# Patient Record
Sex: FEMALE | Race: WHITE | NOT HISPANIC OR LATINO | Employment: OTHER | URBAN - METROPOLITAN AREA
[De-identification: names, ages, dates, MRNs, and addresses within clinical notes are randomized per-mention and may not be internally consistent; named-entity substitution may affect disease eponyms.]

---

## 2021-04-15 DIAGNOSIS — Z23 ENCOUNTER FOR IMMUNIZATION: ICD-10-CM

## 2021-05-10 ENCOUNTER — OFFICE VISIT (OUTPATIENT)
Dept: OBGYN CLINIC | Facility: CLINIC | Age: 76
End: 2021-05-10
Payer: MEDICARE

## 2021-05-10 VITALS
SYSTOLIC BLOOD PRESSURE: 110 MMHG | WEIGHT: 161 LBS | DIASTOLIC BLOOD PRESSURE: 70 MMHG | HEIGHT: 62 IN | BODY MASS INDEX: 29.63 KG/M2

## 2021-05-10 DIAGNOSIS — N81.2 CYSTOCELE AND RECTOCELE WITH INCOMPLETE UTEROVAGINAL PROLAPSE: ICD-10-CM

## 2021-05-10 DIAGNOSIS — Z46.89 PESSARY MAINTENANCE: ICD-10-CM

## 2021-05-10 DIAGNOSIS — N95.2 POSTMENOPAUSAL ATROPHIC VAGINITIS: ICD-10-CM

## 2021-05-10 DIAGNOSIS — E66.3 OVERWEIGHT (BMI 25.0-29.9): ICD-10-CM

## 2021-05-10 DIAGNOSIS — N81.89 PELVIC FLOOR RELAXATION: Primary | ICD-10-CM

## 2021-05-10 DIAGNOSIS — Z78.0 POST-MENOPAUSAL: ICD-10-CM

## 2021-05-10 PROCEDURE — 99213 OFFICE O/P EST LOW 20 MIN: CPT | Performed by: OBSTETRICS & GYNECOLOGY

## 2021-05-10 RX ORDER — ROSUVASTATIN CALCIUM 5 MG/1
5 TABLET, COATED ORAL DAILY
COMMUNITY
Start: 2021-02-19 | End: 2021-05-10 | Stop reason: ALTCHOICE

## 2021-05-10 NOTE — PROGRESS NOTES
[address], [address], [facility], [address] [place] [address]      ASSESSMENT/PLAN: Scotty Barry is a 70 y o   who presents for evaluation for bleeding after replacing her  pessary after a year  - The following were reviewed in today's visit: {Gyn    Additional problems addressed during this visit:  1  Pelvic floor relaxation  -     Pessary    2  Post-menopausal  -     Pessary    3  Pessary maintenance  -     Pessary    4  Cystocele and rectocele with incomplete uterovaginal prolapse  -     Pessary    5  Postmenopausal atrophic vaginitis    6  Overweight (BMI 25 0-29 9)     pt  Here for  Fu  Had not  Been wearing her pessary for most of  Last  Year due to [place]  Placed it in the  vagina  Last  Week,  ( Self Care)  And had some very light spotting  after  Removed   Not using  Estradiol  Cream    No erosion noted on exam    Encouraged use of  Estradiol Cream one inch , nightly for a month then   every other day  Fu in 3 mo wellness  No problems  Emptying  Bowels, w  Pessary out , sometimes has to wait a while to empty  Fu prn  Reviewed  S+s of   Urinary retention  CC: Bleeding after placing   Pessary back in very light pink  Not  Using estradiol  Cream     HPI: see  CC     The following portions of the patient's history were reviewed and updated as appropriate: She  has a past medical history of H/O bone density study (2017), H/O mammogram (2021), Menopausal symptoms, Papanicolaou smear (2017), and Stress incontinence  She  has a past surgical history that includes Hemorroidectomy; Bunionectomy; Cataract extraction (Left); Rotator cuff repair (2016); and DXA procedure(historical) (2017)    Her family history includes Heart attack in her mother; Heart disease in her mother; Hyperlipidemia in her maternal aunt and mother; Osteoarthritis in her maternal grandmother; Stroke in her father and mother; Tuberculosis in her paternal aunt  She  reports that she has never smoked  She has never used smokeless tobacco  She reports current alcohol use  She reports that she does not use drugs  Current Outpatient Medications   Medication Sig Dispense Refill    Nutritional Supplements (CHOLESTEROL DEFENSE PO) Take by mouth       No current facility-administered medications for this visit  She has No Known Allergies       Review of Systems:  All systems normal except as noted in HPIessary since  2019  Pt  Was traveling and  Self cares  + took out  In   Early  2020 and  Just replaced  Last week w  Some  Bleeding  No lekaing of urine  + ability toempty bowels  Semi sexually active  No dysuria or  Frequency  Some  Pressure                Objective:  /70   Ht 5' 2" (1 575 m)   Wt 73 kg (161 lb)   BMI 29 45 kg/m²    Physical Exam  Vitals signs and nursing note reviewed  Constitutional:       Appearance: Normal appearance  HENT:      Head: Normocephalic  Neck:      Musculoskeletal: Normal range of motion  Abdominal:      General: Abdomen is flat  There is no distension  Palpations: Abdomen is soft  There is no mass  Tenderness: There is no abdominal tenderness  There is no guarding or rebound  Hernia: A hernia is present  There is no hernia in the left inguinal area or right inguinal area  Genitourinary:     General: Normal vulva  Exam position: Lithotomy position  Pubic Area: No rash  Labia:         Right: No rash, tenderness or lesion  Left: No rash, tenderness or lesion  Urethra: No prolapse, urethral pain, urethral swelling or urethral lesion  Vagina: No signs of injury and foreign body  Prolapsed vaginal walls present  No vaginal discharge, erythema, bleeding or lesions  Cervix: No cervical motion tenderness, discharge, friability, lesion or cervical bleeding        Uterus: Normal        Adnexa: Right adnexa normal and left adnexa normal       Rectum: Normal  Comments: Vagina  Pale  Pink   2 degree  Cystocele w  Valsalva   Levators   3 on scale  1-4  , no erosion or active bleeding noted  Vagina  Cleansed with soap and water  And pessary replaced  Musculoskeletal: Normal range of motion  Lymphadenopathy:      Lower Body: No right inguinal adenopathy  No left inguinal adenopathy  Skin:     General: Skin is warm and dry  Neurological:      Mental Status: She is alert     Psychiatric:         Mood and Affect: Mood normal

## 2021-05-10 NOTE — PATIENT INSTRUCTIONS
Report increase in leaking of urine    Inability to  Empty bowels or  Bladder  To use  Estradiol  Cream   1  Inch    Every night for   One month then  Every other night  Apply small amt  To  Anterior   Opening    Schedule a wellness exam in  3 mo

## 2021-08-26 ENCOUNTER — OFFICE VISIT (OUTPATIENT)
Dept: OBGYN CLINIC | Facility: CLINIC | Age: 76
End: 2021-08-26
Payer: MEDICARE

## 2021-08-26 VITALS
DIASTOLIC BLOOD PRESSURE: 60 MMHG | WEIGHT: 154.8 LBS | HEIGHT: 62 IN | SYSTOLIC BLOOD PRESSURE: 102 MMHG | BODY MASS INDEX: 28.49 KG/M2

## 2021-08-26 DIAGNOSIS — N81.2 CYSTOCELE AND RECTOCELE WITH INCOMPLETE UTEROVAGINAL PROLAPSE: ICD-10-CM

## 2021-08-26 DIAGNOSIS — Z78.0 POST-MENOPAUSAL: ICD-10-CM

## 2021-08-26 DIAGNOSIS — E66.3 OVERWEIGHT (BMI 25.0-29.9): ICD-10-CM

## 2021-08-26 DIAGNOSIS — N95.2 POSTMENOPAUSAL ATROPHIC VAGINITIS: ICD-10-CM

## 2021-08-26 DIAGNOSIS — N81.89 PELVIC FLOOR RELAXATION: Primary | ICD-10-CM

## 2021-08-26 DIAGNOSIS — Z46.89 PESSARY MAINTENANCE: ICD-10-CM

## 2021-08-26 PROCEDURE — 99213 OFFICE O/P EST LOW 20 MIN: CPT | Performed by: OBSTETRICS & GYNECOLOGY

## 2021-08-26 RX ORDER — ROSUVASTATIN CALCIUM 5 MG/1
5 TABLET, COATED ORAL DAILY
COMMUNITY
Start: 2021-06-18

## 2021-08-26 NOTE — PROGRESS NOTES
PROBLEM GYNECOLOGICAL VISIT    Franc Carroll is a 78 y o  female who presents today for  Removal and  repalcement of pessary  Her general medical history has been reviewed and she reports it as follows:    Past Medical History:   Diagnosis Date    H/O bone density study 2017    Osteopenic     H/O mammogram 2021    BIRADS1    Menopausal symptoms     Papanicolaou smear 2017    Stress incontinence      Past Surgical History:   Procedure Laterality Date    BUNIONECTOMY      CATARACT EXTRACTION Left     [facility]     HEMORROIDECTOMY      ROTATOR CUFF REPAIR  2016     OB History        2    Para   2    Term                AB        Living           SAB        TAB        Ectopic        Multiple        Live Births               Obstetric Comments   Menopause: Age 50  Menarche: Age 8  Vaginal atrophy, pelvic floor relaxation            Social History     Tobacco Use    Smoking status: Never Smoker    Smokeless tobacco: Never Used   Vaping Use    Vaping Use: Never used   Substance Use Topics    Alcohol use: Yes     Comment: Special occasions (1 or less/month)     Drug use: Never     Comment: No        Current Outpatient Medications   Medication Instructions    Nutritional Supplements (CHOLESTEROL DEFENSE PO) Take by mouth    rosuvastatin (CRESTOR) 5 mg, Oral, Daily       History of Present Illness:   Pt  Here for  Pessary removal and  Cleaning  No leaking of  Urine  + , +  Multiple   BM  Attempts  Not  Completely  Emptying  Self cares for  Pessary  Can leave it out for a couple of weeks  Review of Systems:  Review of Systems   Constitutional: Positive for appetite change  Negative for chills and fever  HENT: Negative for ear pain and sore throat  Eyes: Negative for pain and visual disturbance  Respiratory: Negative for cough and shortness of breath      Cardiovascular: Negative for chest pain and palpitations  Gastrointestinal: Negative  Negative for abdominal pain and vomiting  Endocrine: Negative  Genitourinary: Negative  Negative for dysuria, hematuria, menstrual problem, pelvic pain, urgency, vaginal bleeding, vaginal discharge and vaginal pain  Musculoskeletal: Negative for arthralgias and back pain  Skin: Negative for color change and rash  Neurological: Negative  Negative for seizures and syncope  Psychiatric/Behavioral: Negative  All other systems reviewed and are negative  Physical Exam:  /60   Ht 5' 1 5" (1 562 m)   Wt 70 2 kg (154 lb 12 8 oz)   Breastfeeding No   BMI 28 78 kg/m²   Physical Exam  Constitutional:       Appearance: Normal appearance  She is normal weight  Genitourinary:      Pelvic exam was performed with patient in the lithotomy position  Vulva, inguinal canal, urethra and bladder normal       Vaginal atrophic mucosa and prolapse present  Genitourinary Comments: Pessary removed intact    Vagina cleansed with soap  and  Water no erosion  Pessary  Ring w support   Replaced  Noted   2 degree rectocele      Rectum:      External hemorrhoid present  HENT:      Head: Normocephalic  Abdominal:      General: Abdomen is flat  Bowel sounds are normal       Palpations: Abdomen is soft  Neurological:      Mental Status: She is alert  Assessment:   1  Pelvic floor relaxation  , Pessary maintence    Plan:   Report  Bleeding , inability to empty bowels or bladder  Pt considering OC w physician for  Surgical options in  Next  8 mo    Reviewed with patient that test results are available [facility] immediately, but that they will not necessarily be reviewed by me immediately  Explained that I will review results at my earliest opportunity and contact patient appropriately

## 2021-08-31 ENCOUNTER — VBI (OUTPATIENT)
Dept: ADMINISTRATIVE | Facility: OTHER | Age: 76
End: 2021-08-31

## 2021-08-31 NOTE — TELEPHONE ENCOUNTER
Upon review of the In Basket request we were able to locate, review, and update the patient chart as requested for Mammogram and Pap Smear (HPV) aka Cervical Cancer Screening  Any additional questions or concerns should be emailed to the Practice Liaisons via [email or web address] email, please do not reply via In Basket      Thank you  Hailey Ayala

## 2021-11-18 ENCOUNTER — ANNUAL EXAM (OUTPATIENT)
Dept: OBGYN CLINIC | Facility: CLINIC | Age: 76
End: 2021-11-18
Payer: MEDICARE

## 2021-11-18 VITALS
WEIGHT: 157 LBS | SYSTOLIC BLOOD PRESSURE: 120 MMHG | BODY MASS INDEX: 27.82 KG/M2 | DIASTOLIC BLOOD PRESSURE: 62 MMHG | HEIGHT: 63 IN

## 2021-11-18 DIAGNOSIS — Z12.31 ENCOUNTER FOR SCREENING MAMMOGRAM FOR MALIGNANT NEOPLASM OF BREAST: ICD-10-CM

## 2021-11-18 DIAGNOSIS — Z13.820 OSTEOPOROSIS SCREENING: ICD-10-CM

## 2021-11-18 DIAGNOSIS — Z78.0 POST-MENOPAUSAL: ICD-10-CM

## 2021-11-18 DIAGNOSIS — N81.89 PELVIC FLOOR RELAXATION: ICD-10-CM

## 2021-11-18 DIAGNOSIS — N81.2 CYSTOCELE AND RECTOCELE WITH INCOMPLETE UTEROVAGINAL PROLAPSE: ICD-10-CM

## 2021-11-18 DIAGNOSIS — E28.39 ESTROGEN DEFICIENCY: ICD-10-CM

## 2021-11-18 DIAGNOSIS — Z01.419 ENCOUNTER FOR GYNECOLOGICAL EXAMINATION WITHOUT ABNORMAL FINDING: Primary | ICD-10-CM

## 2021-11-18 PROCEDURE — G0101 CA SCREEN;PELVIC/BREAST EXAM: HCPCS | Performed by: OBSTETRICS & GYNECOLOGY

## 2022-02-17 ENCOUNTER — OFFICE VISIT (OUTPATIENT)
Dept: OBGYN CLINIC | Facility: CLINIC | Age: 77
End: 2022-02-17
Payer: MEDICARE

## 2022-02-17 VITALS
WEIGHT: 155.6 LBS | BODY MASS INDEX: 27.57 KG/M2 | HEIGHT: 63 IN | DIASTOLIC BLOOD PRESSURE: 68 MMHG | SYSTOLIC BLOOD PRESSURE: 102 MMHG

## 2022-02-17 DIAGNOSIS — N81.89 PELVIC FLOOR RELAXATION: ICD-10-CM

## 2022-02-17 DIAGNOSIS — E66.3 OVERWEIGHT (BMI 25.0-29.9): ICD-10-CM

## 2022-02-17 DIAGNOSIS — Z78.0 POST-MENOPAUSAL: ICD-10-CM

## 2022-02-17 DIAGNOSIS — N81.2 CYSTOCELE AND RECTOCELE WITH INCOMPLETE UTEROVAGINAL PROLAPSE: ICD-10-CM

## 2022-02-17 DIAGNOSIS — Z46.89 PESSARY MAINTENANCE: ICD-10-CM

## 2022-02-17 DIAGNOSIS — N95.2 POSTMENOPAUSAL ATROPHIC VAGINITIS: Primary | ICD-10-CM

## 2022-02-17 PROCEDURE — 99213 OFFICE O/P EST LOW 20 MIN: CPT | Performed by: OBSTETRICS & GYNECOLOGY

## 2022-02-17 NOTE — PROGRESS NOTES
PROBLEM GYNECOLOGICAL VISIT    Aubree Carpio is a 78 y o  female who presents today for pessary cleaning   No leaking of urine, bleeding   Her general medical history has been reviewed and she reports it as follows:    Past Medical History:   Diagnosis Date    H/O bone density study 2017    Osteopen[facility]     H/O mammogram 2021    BIRADS1    Menopausal symptoms     Papanicolaou smear 2017    Pelvic floor relaxation     Stress incontinence      Past Surgical History:   Procedure Laterality Date    BUNIONECTOMY      CATARACT EXTRACTION Left     [facility]     HEMORROIDECTOMY      ROTATOR CUFF REPAIR  2016    SKIN BIOPSY Right 2022    basal cell removal from right side of chest      OB History        2    Para   2    Term                AB        Living           SAB        IAB        Ectopic        Multiple        Live Births               Obstetric Comments   Menopause: Age 50  Menarche: Age 8  Vaginal atrophy, pelvic floor relaxation            Social History     Tobacco Use    Smoking status: Never Smoker    Smokeless tobacco: Never Used   Vaping Use    Vaping Use: Never used   Substance Use Topics    Alcohol use: Yes     Comment: Special occasions (1 or less/month)     Drug use: Never     Comment: No        Current Outpatient Medications   Medication Instructions    rosuvastatin (CRESTOR) 5 mg, Oral, Daily       History of Present Illness:              Pt  Here for  Pessary removal and  Cleaning  No leaking of  Urine  + , +  Multiple   BM  Attempts  Not  Completely  Emptying  Self cares for  Pessary  Can leave it out for a couple of weeks  Review of Systems:  Review of Systems   Constitutional: Positive for appetite change  Negative for chills and fever  HENT: Negative for ear pain and sore throat  Eyes: Negative for pain and visual disturbance     Respiratory: Negative for cough and shortness of breath  Cardiovascular: Negative for chest pain and palpitations  Gastrointestinal: Negative  Negative for abdominal pain and vomiting  Endocrine: Negative  Genitourinary: Negative  Negative for dysuria, hematuria, menstrual problem, pelvic pain, urgency, vaginal bleeding, vaginal discharge and vaginal pain  Musculoskeletal: Negative for arthralgias and back pain  Skin: Negative for color change and rash  Neurological: Negative  Negative for seizures and syncope  Psychiatric/Behavioral: Negative  All other systems reviewed and are negative  Physical Exam:  /68   Ht 5' 2 5" (1 588 m)   Wt 70 6 kg (155 lb 9 6 oz)   Breastfeeding No   BMI 28 01 kg/m²   Genitourinary:      Pelvic exam was performed with patient in the lithotomy position  Vulva, inguinal canal, urethra and bladder normal       Vaginal atrophic mucosa and prolapse present  Genitourinary Comments: Pessary removed intact    Vagina cleansed with soap  and  Water no erosion  Pessary  Ring w support   Replaced  Noted   2 degree rectocele      Rectum:      External hemorrhoid present  HENT:      Head: Normocephalic  Abdominal:      General: Abdomen is flat  Bowel sounds are normal       Palpations: Abdomen is soft  Neurological:      Mental Status: She is alert  Assessment:              1  Pelvic floor relaxation  , Pessary maintence     Plan:              Report  Bleeding , inability to empty bowels or bladder  Pt considering OC w physician for  Surgical options in  Next  8 mo     Reviewed with patient that test results are available in MyChart immediately, but that they will not necessarily be reviewed by me immediately  Explained that I will review results at my earliest opportunity and contact patient appropriately

## 2022-08-18 ENCOUNTER — OFFICE VISIT (OUTPATIENT)
Dept: OBGYN CLINIC | Facility: CLINIC | Age: 77
End: 2022-08-18

## 2022-08-18 VITALS
BODY MASS INDEX: 28.6 KG/M2 | DIASTOLIC BLOOD PRESSURE: 60 MMHG | HEIGHT: 62 IN | SYSTOLIC BLOOD PRESSURE: 118 MMHG | WEIGHT: 155.4 LBS

## 2022-08-18 DIAGNOSIS — N81.2 CYSTOCELE AND RECTOCELE WITH INCOMPLETE UTEROVAGINAL PROLAPSE: ICD-10-CM

## 2022-08-18 DIAGNOSIS — N95.2 POSTMENOPAUSAL ATROPHIC VAGINITIS: Primary | ICD-10-CM

## 2022-08-18 DIAGNOSIS — Z46.89 PESSARY MAINTENANCE: ICD-10-CM

## 2022-08-18 NOTE — PATIENT INSTRUCTIONS
Report  Vaginal bleeding, odor and inability to empty bowels and  Bladder    Call  when  you are ready for a consult with  physician

## 2022-08-18 NOTE — PROGRESS NOTES
PROBLEM GYNECOLOGICAL VISIT    Daniel Rubio is a 78 y o  female who presents today for pessary cleaning   No leaking of urine, bleeding   Her general medical history has been reviewed and she reports it as follows:    Past Medical History:   Diagnosis Date    H/O bone density study 2017    [facility]steopen[facility]     H/O mammogram 2021    BIRADS1    Menopausal symptoms     Papanicolaou smear 2017    Pelvic floor relaxation     Stress incontinence      Past Surgical History:   Procedure Laterality Date    BUNIONECTOMY      CATARACT EXTRACTION Left     [facility]     HEMORROIDECTOMY      ROTATOR CUFF REPAIR  2016    SKIN BIOPSY Right 2022    basal cell removal from right side of chest      OB History        2    Para   2    Term                AB        Living           SAB        IAB        Ectopic        Multiple        Live Births               Obstetric Comments   Menopause: Age 50  Menarche: Age 8  Vaginal atrophy, pelvic floor relaxation            Social History     Tobacco Use    Smoking status: Never Smoker    Smokeless tobacco: Never Used   Vaping Use    Vaping Use: Never used   Substance Use Topics    Alcohol use: Yes     Comment: Special occasions (1 or less/month)     Drug use: Never     Comment: No        Current Outpatient Medications   Medication Instructions    rosuvastatin (CRESTOR) 5 mg, Oral, Daily       History of Present Illness:              Pt  Here for  Pessary removal and  Cleaning  No leaking of  Urine  + , +  Multiple   BM  Attempts  Not  Completely  Emptying  Self cares for  Pessary  Can leave it out for a couple of weeks  Review of Systems:  Review of Systems   Constitutional: Positive for appetite change  Negative for chills and fever  HENT: Negative for ear pain and sore throat  Eyes: Negative for pain and visual disturbance     Respiratory: Negative for cough and shortness of breath  Cardiovascular: Negative for chest pain and palpitations  Gastrointestinal: Negative  Negative for abdominal pain and vomiting  Endocrine: Negative  Genitourinary: Negative  Negative for dysuria, hematuria, menstrual problem, pelvic pain, urgency, vaginal bleeding, vaginal discharge and vaginal pain  Musculoskeletal: Negative for arthralgias and back pain  Skin: Negative for color change and rash  Neurological: Negative  Negative for seizures and syncope  Psychiatric/Behavioral: Negative  All other systems reviewed and are negative  Physical Exam:  There were no vitals taken for this visit  Genitourinary:      Pelvic exam was performed with patient in the lithotomy position  Vulva, inguinal canal, urethra and bladder normal       Vaginal atrophic mucosa and prolapse present  Genitourinary Comments: Pessary removed intact    Vagina cleansed with soap  and  Water no erosion  Pessary  Ring w support   Replaced  Noted   2 degree rectocele      Rectum:      External hemorrhoid present  HENT:      Head: Normocephalic  Abdominal:      General: Abdomen is flat  Bowel sounds are normal       Palpations: Abdomen is soft  Neurological:      Mental Status: She is alert  Assessment:              1  Pelvic floor relaxation  , Pessary maintence     Plan:              Report  Bleeding , inability to empty bowels or bladder  Pt considering OC w physician for  Surgical options in  Next  8 mo     Reviewed with patient that [facility], but that they will not necessarily be reviewed by me immediately  Explained that I will review results at my earliest opportunity and contact patient appropriately

## 2023-02-23 ENCOUNTER — OFFICE VISIT (OUTPATIENT)
Dept: OBGYN CLINIC | Facility: CLINIC | Age: 78
End: 2023-02-23

## 2023-02-23 VITALS
BODY MASS INDEX: 27.75 KG/M2 | DIASTOLIC BLOOD PRESSURE: 62 MMHG | SYSTOLIC BLOOD PRESSURE: 110 MMHG | WEIGHT: 156.6 LBS | HEIGHT: 63 IN

## 2023-02-23 DIAGNOSIS — E66.3 OVERWEIGHT (BMI 25.0-29.9): ICD-10-CM

## 2023-02-23 DIAGNOSIS — Z46.89 PESSARY MAINTENANCE: ICD-10-CM

## 2023-02-23 DIAGNOSIS — Z78.0 POST-MENOPAUSAL: ICD-10-CM

## 2023-02-23 DIAGNOSIS — N81.2 CYSTOCELE AND RECTOCELE WITH INCOMPLETE UTEROVAGINAL PROLAPSE: ICD-10-CM

## 2023-02-23 DIAGNOSIS — N81.89 PELVIC FLOOR RELAXATION: ICD-10-CM

## 2023-02-23 DIAGNOSIS — N95.2 POSTMENOPAUSAL ATROPHIC VAGINITIS: Primary | ICD-10-CM

## 2023-02-23 NOTE — PROGRESS NOTES
PROBLEM GYNECOLOGICAL VISIT    Radha Marie is a 78 y o  female who presents today for pessary cleaning   No leaking of urine, bleeding   Her general medical history has been reviewed and she reports it as follows:    Past Medical History:   Diagnosis Date   • Cancer ([address] ) 3/2020    vry small melanoma   • Corneal abrasion 2022    right eye   • H/O bone density study 2017    Osteopenic    • H/O mammogram 2021    BIRADS1   • Menopausal symptoms    • Papanicolaou smear 2017   • Pelvic floor relaxation    • Stress incontinence      Past Surgical History:   Procedure Laterality Date   • BUNIONECTOMY     • CATARACT EXTRACTION Left    • DXA PROCEDURE (HISTORICAL)  2017    [facility]    • HEMORROIDECTOMY     • ROTATOR CUFF REPAIR     • SKIN BIOPSY Right 2022    basal cell removal from right side of chest      OB History        2    Para   2    Term                AB        Living           SAB        IAB        Ectopic        Multiple        Live Births               Obstetric Comments   Menopause: Age 50  Menarche: Age 8  Vaginal atrophy, pelvic floor relaxation            Social History     Tobacco Use   • Smoking status: Never   • Smokeless tobacco: Never   Vaping Use   • Vaping Use: Never used   Substance Use Topics   • Alcohol use: Not Currently     Comment: Special occasions (1 or less/month)    • Drug use: Never     Comment: No        No current outpatient medications    History of Present Illness:              Pt  Here for  Pessary removal and  Cleaning  No leaking of  Urine  Some discomort off and on w  BM  Edd Po Self cares for  Pessary  Can leave it out for a couple of weeks/days   No odor         Review of Systems:  Review of Systems   Constitutional: Negative for chills and fever  HENT: Negative for ear pain and sore throat  Eyes: Negative for pain and visual disturbance  Respiratory: Negative for cough and shortness of breath  Cardiovascular: Negative for chest pain and palpitations  Gastrointestinal: Negative  Negative for abdominal pain and vomiting  Endocrine: Negative  Genitourinary: Negative  Negative for dysuria, hematuria, menstrual problem, pelvic pain, urgency, vaginal bleeding, vaginal discharge and vaginal pain  Musculoskeletal: Negative for arthralgias and back pain  Skin: Negative for color change and rash  Neurological: Negative  Negative for seizures and syncope  Psychiatric/Behavioral: Negative  All other systems reviewed and are negative  Physical Exam:  /62   Ht 5' 2 5" (1 588 m)   Wt 71 kg (156 lb 9 6 oz)   BMI 28 19 kg/m²   Genitourinary:      Pelvic exam was performed with patient in the lithotomy position  Vulva, inguinal canal, urethra and bladder normal       Vaginal atrophic mucosa and prolapse present  Genitourinary Comments: Pessary removed by patient after multiple trys  Pt does on the  Commode  Vagina cleansed with soap  and  Water no erosion  Pessary  Ring w support    Not replaced  Today  Pt woulld like to leave out    Noted   2 degree rectocele      Rectum:      External hemorrhoid present  HENT:      Head: Normocephalic  Abdominal:      General: Abdomen is flat  Bowel sounds are normal       Palpations: Abdomen is soft  Neurological:      Mental Status: She is alert  Assessment:              1  Pelvic floor relaxation  , Pessary maintence     Plan:              Report  Bleeding , inability to empty bowels or bladder  Pt considering OC w physician for  Surgical options in  Next  8 mo  Has a seeing eye  Puppy  Currently  Reviewed with patient that [facility] results are [facility] immediately, but that they will not necessarily be reviewed by me immediately  Explained that I will review results at my earliest opportunity and contact patient appropriately

## 2023-05-17 NOTE — PROGRESS NOTES
PROBLEM GYNECOLOGICAL VISIT    Corbin Kim is a 78 y o  female who presents today with complaint of frequency of urination  Her general medical history has been reviewed and she reports it as follows:    Past Medical History:   Diagnosis Date   • Cancer ([address] ) 3/2020    vry small melanoma   • Corneal abrasion 2022    right eye   • H/O bone density study 2017    Osteopenic    • H/O mammogram 2021    BIRADS1   • Menopausal symptoms    • Papanicolaou smear 2017   • Pelvic floor relaxation    • Stress incontinence      Past Surgical History:   Procedure Laterality Date   • BUNIONECTOMY     • CATARACT EXTRACTION Left    • DXA PROCEDURE (HISTORICAL)  2017    [facility]    • HEMORROIDECTOMY     • ROTATOR CUFF REPAIR     • SKIN BIOPSY Right 2022    basal cell removal from right side of chest      OB History        2    Para   2    Term                AB        Living           SAB        IAB        Ectopic        Multiple        Live Births               Obstetric Comments   Menopause: Age 50  Menarche: Age 8  Vaginal atrophy, pelvic floor relaxation            Social History     Tobacco Use   • Smoking status: Never   • Smokeless tobacco: Never   Vaping Use   • Vaping Use: Never used   Substance Use Topics   • Alcohol use: Not Currently     Comment: Special occasions (1 or less/month)    • Drug use: Never     Comment: No        No current outpatient medications    History of Present Illness:   + HAS BEEN HAVING URINARY FREQUENCY FOR THE PAST   FEW WEEKS  NO URGENCY   DENIES FEVERS, CHILLS, BACK PAIN  PRIOR USE OF A MEDICATION FOR  THIS   NO VAGINAL BLEEDING OR LEAKING OF URINE    Review of Systems:  Review of Systems   Constitutional: Negative  Gastrointestinal: Negative  Endocrine: Negative  Genitourinary: Positive for dysuria, frequency and urgency  Negative for flank pain, menstrual problem, vaginal bleeding, vaginal discharge and vaginal pain  Musculoskeletal: Positive for back pain  Neurological: Negative  Hematological: Negative  Psychiatric/Behavioral: Negative  Physical Exam:  There were no vitals taken for this visit  Physical Exam  Genitourinary:      Bladder, rectum and urethral meatus normal       No lesions in the vagina  Genitourinary Comments: PESSARY REMOVED INTACT    NO  BLEEDING NOTED         Right Labia: No rash, tenderness, lesions or skin changes  Left Labia: No tenderness, lesions or skin changes  No labial fusion noted  No inguinal adenopathy present in the right or left side  Pelvic Lee Score: 4/5  No vaginal discharge, erythema, tenderness, bleeding, ulceration or granulation tissue  Apical vaginal prolapse present  Mild vaginal atrophy present  Right Adnexa: not tender, not full, no mass present and not absent  Left Adnexa: not tender, not full and no mass present  Cervix is not parous  No cervical motion tenderness or friability  Uterus is not enlarged or tender  No urethral prolapse, tenderness, mass, hypermobility or discharge present  Pelvic exam was performed with patient in the lithotomy position  Abdominal:      General: Abdomen is flat  There is no distension  Palpations: Abdomen is soft  There is no mass  Tenderness: There is no abdominal tenderness  There is no right CVA tenderness, left CVA tenderness, guarding or rebound  Hernia: No hernia is present  There is no hernia in the left inguinal area or right inguinal area  Lymphadenopathy:      Lower Body: No right inguinal adenopathy  No left inguinal adenopathy  Neurological:      Mental Status: She is alert  Point of Care Testing:      -urinalysis: SMALL LEUCO CYTES PRESENT      Assessment:   1     FREQUENCY OF URINATION,   PELVIC FLOOR RELAXATION       Plan:   UA  AND  C+S SENT ,  [facility] [place],  PT TRAVELING TO [facility]  AND PREVIOUSLY USED MIRABEGRON 25 MG  W + RESULTS,  DW PT MAY BE  EXPENSIVE  AND TO CHECK ON  COST  PREVIOUSLY USED PRN     NOW PT AWARE CAN BE INCREASE COST  WILL CALL IF NEEDS ALT  MED  Reviewed with patient that test results are available in [facility] immediately, but that they will not necessarily be reviewed by me immediately  Explained that I will review results at my earliest opportunity and contact patient appropriately

## 2023-05-18 ENCOUNTER — TELEPHONE (OUTPATIENT)
Dept: OBGYN CLINIC | Facility: CLINIC | Age: 78
End: 2023-05-18

## 2023-05-18 ENCOUNTER — OFFICE VISIT (OUTPATIENT)
Dept: OBGYN CLINIC | Facility: CLINIC | Age: 78
End: 2023-05-18

## 2023-05-18 VITALS
SYSTOLIC BLOOD PRESSURE: 128 MMHG | WEIGHT: 158.2 LBS | DIASTOLIC BLOOD PRESSURE: 70 MMHG | HEIGHT: 62 IN | BODY MASS INDEX: 29.11 KG/M2

## 2023-05-18 DIAGNOSIS — N81.2 CYSTOCELE AND RECTOCELE WITH INCOMPLETE UTEROVAGINAL PROLAPSE: ICD-10-CM

## 2023-05-18 DIAGNOSIS — R35.0 FREQUENCY OF URINATION: Primary | ICD-10-CM

## 2023-05-18 DIAGNOSIS — Z46.89 PESSARY MAINTENANCE: ICD-10-CM

## 2023-05-18 DIAGNOSIS — E28.39 ESTROGEN DEFICIENCY: ICD-10-CM

## 2023-05-18 DIAGNOSIS — N81.89 PELVIC FLOOR RELAXATION: ICD-10-CM

## 2023-05-18 LAB
SL AMB  POCT GLUCOSE, UA: NEGATIVE
SL AMB LEUKOCYTE ESTERASE,UA: ABNORMAL
SL AMB POCT BLOOD,UA: NEGATIVE
SL AMB POCT KETONES,UA: NEGATIVE
SL AMB POCT NITRITE,UA: NEGATIVE
SL AMB POCT PH,UA: 5
SL AMB POCT SPECIFIC GRAVITY,UA: 1.03
SL AMB POCT URINE PROTEIN: NEGATIVE

## 2023-05-18 RX ORDER — TROSPIUM CHLORIDE 20 MG/1
20 TABLET, FILM COATED ORAL 2 TIMES DAILY
Qty: 60 TABLET | Refills: 0 | Status: SHIPPED | OUTPATIENT
Start: 2023-05-18 | End: 2023-05-18

## 2023-05-18 RX ORDER — OXYBUTYNIN CHLORIDE 5 MG/1
5 TABLET, EXTENDED RELEASE ORAL DAILY
Qty: 30 TABLET | Refills: 0 | Status: SHIPPED | OUTPATIENT
Start: 2023-05-18 | End: 2023-06-17

## 2023-05-18 RX ORDER — DOXYCYCLINE HYCLATE 100 MG/1
CAPSULE ORAL
COMMUNITY
Start: 2023-05-12

## 2023-05-18 NOTE — TELEPHONE ENCOUNTER
Pt was seen in the office today and provided a prescription for Mirabegron ER 25 MG  Pt informed the Rx will cost $448  Pt was advised to call or alternative medication that is more cost effective  Please review and advise

## 2023-05-18 NOTE — TELEPHONE ENCOUNTER
Left a detailed message for patient informing an alternative medication called (Oxyubutin/Ditropan XL) has been sent to [facility], she is to take 1 tablet (5mg) once a day  If patient has any further questions, please call the office

## 2023-05-19 DIAGNOSIS — R35.0 FREQUENCY OF URINATION: ICD-10-CM

## 2023-05-19 DIAGNOSIS — R82.998 URINARY CRYSTALS: Primary | ICD-10-CM

## 2023-05-19 LAB
APPEARANCE UR: CLEAR
BACTERIA UR QL AUTO: ABNORMAL /HPF
BILIRUB UR QL STRIP: NEGATIVE
COLOR UR: YELLOW
GLUCOSE UR QL STRIP: NEGATIVE
HGB UR QL STRIP: NEGATIVE
HYALINE CASTS #/AREA URNS LPF: ABNORMAL /LPF
KETONES UR QL STRIP: NEGATIVE
LEUKOCYTE ESTERASE UR QL STRIP: ABNORMAL
NITRITE UR QL STRIP: NEGATIVE
PH UR STRIP: ABNORMAL [PH] (ref 5–8)
PROT UR QL STRIP: NEGATIVE
RBC #/AREA URNS HPF: ABNORMAL /HPF
SP GR UR STRIP: 1.02 (ref 1–1.03)
SQUAMOUS #/AREA URNS HPF: ABNORMAL /HPF
URATE CRY #/AREA URNS HPF: ABNORMAL /HPF
WBC #/AREA URNS HPF: ABNORMAL /HPF

## 2023-05-19 RX ORDER — NITROFURANTOIN 25; 75 MG/1; MG/1
100 CAPSULE ORAL 2 TIMES DAILY
Qty: 10 CAPSULE | Refills: 0 | Status: SHIPPED | OUTPATIENT
Start: 2023-05-19 | End: 2023-05-24

## 2023-09-05 NOTE — PROGRESS NOTES
PROBLEM GYNECOLOGICAL VISIT    Cintia Marks is a 70 y.o. female who presents today for pessary cleaning . Her general medical history has been reviewed and she reports it as follows:    Past Medical History:   Diagnosis Date   • Cancer ([address]) 3/2020    vry small melanoma   • Corneal abrasion 2022    right eye   • H/O bone density study 2017    Osteopenic    • H/O mammogram 2021    BIRADS1   • Lyme disease 2023   • Menopausal symptoms    • Papanicolaou smear 2017   • Pelvic floor relaxation    • Stress incontinence      Past Surgical History:   Procedure Laterality Date   • BUNIONECTOMY     • CATARACT EXTRACTION Left    • DXA PROCEDURE (HISTORICAL)  2017    [facility]    • HEMORROIDECTOMY     • ROTATOR CUFF REPAIR     • SKIN BIOPSY Right 2022    basal cell removal from right side of chest.     OB History        2    Para   2    Term                AB        Living           SAB        IAB        Ectopic        Multiple        Live Births               Obstetric Comments   Menopause: Age 50  Menarche: Age 8  Vaginal atrophy, pelvic floor relaxation            Social History     Tobacco Use   • Smoking status: Never   • Smokeless tobacco: Never   Vaping Use   • Vaping Use: Never used   Substance Use Topics   • Alcohol use: Not Currently     Comment: Special occasions (1 or less/month)    • Drug use: Never     Comment: No        Current Outpatient Medications   Medication Instructions   • oxybutynin (DITROPAN-XL) 5 mg, Oral, Daily   • oxybutynin (DITROPAN-XL) 5 mg, Oral, As needed       History of Present Illness:    + hx of   Frequency of urination w   Crystals in urine  Urinalysis w reflex sent today. No bleeding  Some constipation. Review of Systems:  Review of Systems   Constitutional: Negative. Gastrointestinal: Negative. Genitourinary: Negative. Musculoskeletal: Negative. Allergic/Immunologic: Negative.     Neurological: Negative. Hematological: Negative. Psychiatric/Behavioral: Negative. Physical Exam:  /60   Ht 5' 2" (1.575 m)   Wt 72.1 kg (159 lb)   Breastfeeding No   BMI 29.08 kg/m²   Physical Exam  Constitutional:       Appearance: Normal appearance. Genitourinary:      Vulva, bladder, rectum and urethral meatus normal.      Right Labia: No rash, tenderness, lesions or skin changes. Left Labia: No tenderness, lesions or skin changes. No inguinal adenopathy present in the right side. Pelvic Lee Score: 4/5. No vaginal discharge, erythema, tenderness, bleeding, ulceration or granulation tissue. Anterior vaginal prolapse present. Mild vaginal atrophy present. Right Adnexa: not tender, not full and no mass present. Left Adnexa: not tender, not full and no mass present. No urethral prolapse, tenderness, mass, hypermobility, discharge or stress urinary incontinence with cough stress test present. Pelvic Floor: Levator muscle strength is 4/5. Pelvic floor neuro is intact. POP-Q measurements were: Aa: -2, Ba: -2, C:   gH: pB: TVL:    Ap: -3, Bp: -3, D:     Pelvic exam was performed with patient in the lithotomy position. Abdominal:      General: Abdomen is flat. Palpations: Abdomen is soft. Hernia: There is no hernia in the left inguinal area or right inguinal area. Lymphadenopathy:      Lower Body: No right inguinal adenopathy. Neurological:      Mental Status: She is alert and oriented to person, place, and time. Skin:     General: Skin is warm and dry. Psychiatric:         Mood and Affect: Mood normal.         Behavior: Behavior normal.         Thought Content: Thought content normal.         Judgment: Judgment normal.   Vitals reviewed. Pessary removed intact   Scant white dc no odor. Vagina visualized no erosion noted  No bleeding. Vagina cleansed w soap and water.   VE done   Pessary replaced with out difficulty no movement w Valsalva. I have spent a total time of 21 minutes on 09/07/23 in caring for this patient including Instructions for management, Risk factor reductions, Documenting in the medical record and Reviewing / ordering tests, medicine, procedures  .    : Assessment:   1. Cystocele  Pelvic floor relaxation  2. Post menopausal, 3 Screening for breast cancer  4. Hx of urine crystals      There are other unrelated non-urgent complaints, but due to the busy schedule and the amount of time I've already spent with her, time does not permit me to address these routine issues at today's visit. I've requested another appointment to review these additional issues. Plan:    continue w  Self  pessary care as needed fu  Every  6 mo. Report bleeding, bloating and satiety, screening mammogram   *fu in  6 mo for  Well woman exam   Reviewed with patient that test results are available in MyChart immediately, but that they will not necessarily be reviewed by me immediately. Explained that I will review results at my earliest opportunity and contact patient appropriately.

## 2023-09-07 ENCOUNTER — OFFICE VISIT (OUTPATIENT)
Dept: OBGYN CLINIC | Facility: CLINIC | Age: 78
End: 2023-09-07
Payer: MEDICARE

## 2023-09-07 VITALS
WEIGHT: 159 LBS | DIASTOLIC BLOOD PRESSURE: 60 MMHG | BODY MASS INDEX: 29.26 KG/M2 | SYSTOLIC BLOOD PRESSURE: 118 MMHG | HEIGHT: 62 IN

## 2023-09-07 DIAGNOSIS — R82.998 URINARY CRYSTALS: ICD-10-CM

## 2023-09-07 DIAGNOSIS — N81.89 PELVIC FLOOR RELAXATION: ICD-10-CM

## 2023-09-07 DIAGNOSIS — Z12.31 ENCOUNTER FOR SCREENING MAMMOGRAM FOR MALIGNANT NEOPLASM OF BREAST: ICD-10-CM

## 2023-09-07 DIAGNOSIS — E28.39 ESTROGEN DEFICIENCY: ICD-10-CM

## 2023-09-07 DIAGNOSIS — N95.2 POSTMENOPAUSAL ATROPHIC VAGINITIS: ICD-10-CM

## 2023-09-07 DIAGNOSIS — N81.2 CYSTOCELE AND RECTOCELE WITH INCOMPLETE UTEROVAGINAL PROLAPSE: Primary | ICD-10-CM

## 2023-09-07 PROCEDURE — 99213 OFFICE O/P EST LOW 20 MIN: CPT | Performed by: OBSTETRICS & GYNECOLOGY

## 2023-09-07 RX ORDER — OXYBUTYNIN CHLORIDE 5 MG/1
5 TABLET, EXTENDED RELEASE ORAL AS NEEDED
COMMUNITY

## 2023-09-07 NOTE — PATIENT INSTRUCTIONS
Mammogram   AMBULATORY CARE:   What you need to know about a mammogram:  A mammogram is an x-ray of your breasts to screen for breast cancer. Your healthcare provider will talk with you about the benefits and risks of mammograms. Together you will decide when you will get your first mammogram. This is usually at age 39 or 48. Your provider may recommend you start at 36 or younger if your risk for breast cancer is high. Mammograms usually continue every 1 to 2 years until age 76. How to prepare for a mammogram:   Do not use deodorant, powder, lotion, or perfume. These products may cause particles to appear on your mammogram.    Wear a 2-piece outfit. If your breasts are tender before your monthly period, do not have a mammogram during this time. Schedule your mammogram for 1 week after your period ends. If you are breastfeeding, express as much milk as possible before the mammogram.    Bring a list of the dates and places of your past mammograms and other breast tests or treatments. What will happen during a mammogram:  A top view and a side view x-ray are usually done for each breast. Tell healthcare providers if you have breast implants or breast problems before you have your mammogram. You may need extra x-rays of each breast.  You will be given a hospital gown. Take off your clothes from the waist up. Wear the hospital gown so that it opens in the front. You will sit or stand next to a small x-ray machine. The healthcare provider will help you place one of your breasts on the x-ray plate. Your arm and breast will be moved until your breast is in the correct position. Your breast will be gently pressed between 2 plates for a few seconds while the x-ray is taken. This may be uncomfortable. You will be asked to hold your breath while the x-ray is taken. Another x-ray will be taken of the same breast after the position of the x-ray machine has been changed.     Your other breast will be x-rayed the same way. What will happen after your mammogram:  Your breasts may feel tender for a short time after the mammogram. You may go back to your regular activities. Ask your healthcare provider when you should receive the results of your mammogram.  Risks of a mammogram:  You will be exposed to a small amount of radiation. Some breast cancers may not show up on mammograms. Call your doctor if:   You do not receive your results when expected. You have questions or concerns about the mammogram.    Follow up with your doctor as directed:  Write down your questions so you remember to ask them during your visits. © Copyright [employer] 2022 Information is for End User's use only and may not be sold, redistributed or otherwise used for commercial purposes. The above information is an  only. It is not intended as medical advice for individual conditions or treatments. Talk to your doctor, nurse or pharmacist before following any medical regimen to see if it is safe and effective for you.

## 2023-09-08 LAB
APPEARANCE UR: ABNORMAL
BACTERIA UR QL AUTO: ABNORMAL /HPF
BILIRUB UR QL STRIP: NEGATIVE
CAOX CRY #/AREA URNS HPF: ABNORMAL /HPF
COLOR UR: YELLOW
GLUCOSE UR QL STRIP: NEGATIVE
HGB UR QL STRIP: NEGATIVE
HYALINE CASTS #/AREA URNS LPF: ABNORMAL /LPF
KETONES UR QL STRIP: NEGATIVE
LEUKOCYTE ESTERASE UR QL STRIP: ABNORMAL
NITRITE UR QL STRIP: NEGATIVE
PH UR STRIP: 6 [PH] (ref 5–8)
PROT UR QL STRIP: NEGATIVE
RBC #/AREA URNS HPF: ABNORMAL /HPF
SP GR UR STRIP: 1.02 (ref 1–1.03)
SQUAMOUS #/AREA URNS HPF: ABNORMAL /HPF
WBC #/AREA URNS HPF: ABNORMAL /HPF

## 2023-10-31 DIAGNOSIS — Z12.31 ENCOUNTER FOR SCREENING MAMMOGRAM FOR MALIGNANT NEOPLASM OF BREAST: ICD-10-CM

## 2024-08-22 ENCOUNTER — OFFICE VISIT (OUTPATIENT)
Dept: OBGYN CLINIC | Facility: CLINIC | Age: 79
End: 2024-08-22
Payer: MEDICARE

## 2024-08-22 VITALS
SYSTOLIC BLOOD PRESSURE: 126 MMHG | WEIGHT: 157.6 LBS | HEIGHT: 62 IN | DIASTOLIC BLOOD PRESSURE: 72 MMHG | BODY MASS INDEX: 29 KG/M2

## 2024-08-22 DIAGNOSIS — N95.2 POSTMENOPAUSAL ATROPHIC VAGINITIS: ICD-10-CM

## 2024-08-22 DIAGNOSIS — N81.2 CYSTOCELE AND RECTOCELE WITH INCOMPLETE UTEROVAGINAL PROLAPSE: Primary | ICD-10-CM

## 2024-08-22 DIAGNOSIS — Z78.0 POST-MENOPAUSAL: ICD-10-CM

## 2024-08-22 DIAGNOSIS — N81.89 PELVIC FLOOR RELAXATION: ICD-10-CM

## 2024-08-22 DIAGNOSIS — N76.0 ACUTE VAGINITIS: ICD-10-CM

## 2024-08-22 PROCEDURE — 99213 OFFICE O/P EST LOW 20 MIN: CPT | Performed by: OBSTETRICS & GYNECOLOGY

## 2024-08-22 RX ORDER — NYSTATIN AND TRIAMCINOLONE ACETONIDE 100000; 1 [USP'U]/G; MG/G
OINTMENT TOPICAL 2 TIMES DAILY
Qty: 30 G | Refills: 1 | Status: SHIPPED | OUTPATIENT
Start: 2024-08-22 | End: 2024-09-16

## 2024-08-22 RX ORDER — FLUCONAZOLE 150 MG/1
150 TABLET ORAL DAILY
Qty: 2 TABLET | Refills: 1 | Status: SHIPPED | OUTPATIENT
Start: 2024-08-22 | End: 2024-08-24

## 2024-08-22 RX ORDER — OMEPRAZOLE 20 MG/1
20 TABLET, DELAYED RELEASE ORAL DAILY
COMMUNITY

## 2024-08-22 NOTE — PROGRESS NOTES
PROBLEM GYNECOLOGICAL VISIT    Lexi Samuels is a 79 y.o. female who presents today with complaint of vaginal itching and pessary cleaning  .     Her general medical history has been reviewed and she reports it as follows:    Past Medical History:   Diagnosis Date   • Cancer ([facility]) 3/2020    vry small melanoma   • Corneal abrasion 2022    right eye   • H/O bone density study 2017    Osteopenic    • H/O mammogram 2021    BIRADS1   • Lyme disease 2023   • Lyme disease    • Menopausal symptoms    • Papanicolaou smear 2017   • Pelvic floor relaxation    • Stress incontinence    • [place] teeth extracted      Past Surgical History:   Procedure Laterality Date   • BUNIONECTOMY     • CATARACT EXTRACTION Left    • DXA PROCEDURE (HISTORICAL)  2017    [facility]    • HEMORROIDECTOMY     • ROTATOR CUFF REPAIR     • SKIN BIOPSY Right 2022    basal cell removal from right side of chest.     OB History        2    Para   2    Term                AB        Living           SAB        IAB        Ectopic        Multiple        Live Births               Obstetric Comments   Menopause: Age 52  Menarche: Age 13  Vaginal atrophy, pelvic floor relaxation            Social History     Tobacco Use   • Smoking status: Never     Passive exposure: Never   • Smokeless tobacco: Never   Vaping Use   • Vaping status: Never Used   Substance Use Topics   • Alcohol use: Not Currently     Comment: Special occasions (1 or less/month)    • Drug use: Never     Comment: No        Current Outpatient Medications   Medication Instructions   • fluconazole (DIFLUCAN) 150 mg, Oral, Daily   • nystatin-triamcinolone (MYCOLOG-II) ointment Topical, 2 times daily   • omeprazole (PRILOSEC OTC) 20 mg, Oral, Daily   • oxybutynin (DITROPAN-XL) 5 mg, Oral, As needed       History of Present Illness:   + hx of   cystocle and  rectocele  no  bleeding  no abnormal DC  +.  + difficult to get the pessary out.     "+ vaginal and vulvar itching .  Multiple  abx  recently.  Going to [place]  in  5 d.     Review of Systems:  Review of Systems   Constitutional: Negative.    Endocrine: Negative.    Genitourinary:  Positive for vaginal discharge.   Musculoskeletal: Negative.    Neurological: Negative.    Hematological: Negative.    Psychiatric/Behavioral: Negative.         Physical Exam:  /72 (BP Location: Left arm, Patient Position: Sitting, Cuff Size: Standard)   Ht 5' 2\" (1.575 m)   Wt 71.5 kg (157 lb 9.6 oz)   BMI 28.83 kg/m²   Physical Exam  Constitutional:       Appearance: Normal appearance.   Genitourinary:      Vulva, bladder, rectum and urethral meatus normal.      Right Labia: skin changes.      Right Labia: No rash, tenderness or lesions.     Left Labia: No tenderness, lesions, skin changes or rash.     No inguinal adenopathy present in the right or left side.     Vaginal discharge present.      No vaginal erythema, tenderness, bleeding, ulceration or granulation tissue.      Anterior vaginal prolapse present.     Mild vaginal atrophy present.       Right Adnexa: not tender, not full and no mass present.     Left Adnexa: not tender, not full and no mass present.     No urethral prolapse, tenderness, mass or hypermobility present.      Pelvic floor neuro is intact.     Pelvic exam was performed with patient in the lithotomy position.   Abdominal:      Palpations: Abdomen is soft.      Hernia: There is no hernia in the left inguinal area or right inguinal area.   Musculoskeletal:         General: Normal range of motion.   Lymphadenopathy:      Lower Body: No right inguinal adenopathy. No left inguinal adenopathy.   Neurological:      Mental Status: She is alert and oriented to person, place, and time.   Skin:     General: Skin is warm and dry.       Pessary    Date/Time: 8/22/2024 8:30 AM    Performed by: PATRICIO Tucker  Authorized by: PATRICIO Tucker  Universal Protocol:  procedure " performed by consultantConsent: Verbal consent obtained. Written consent obtained.  Consent given by: patient  Patient understanding: patient states understanding of the procedure being performed  Patient consent: the patient's understanding of the procedure matches consent given  Procedure consent: procedure consent matches procedure scheduled  Relevant documents: relevant documents present and verified  Patient identity confirmed: verbally with patient    Indication:     Indication for pessary: cystocele and rectocele    Pre-procedure:     Pessary procedure type:  Cleaning/check  Problems:     Pessary complications: none    Procedure:     Pessary type:  Ring w/ support     Assessment:   1.  Pessary care     Plan:   Report  bleeding, inability ro empty Bowels or  bladder .  Fu in  3 mo or prn.  Can come in  every 10-12 weeks for  cleaning.   May use mycolog cream external  bid  for one week .  Diflucan  150 mg today and  repeat in  3  d.    Reviewed with patient that test results are available in [facility] immediately, but that they will not necessarily be reviewed by me immediately.  Explained that I will review results at my earliest opportunity and contact patient appropriately. I have spent a total time of 25 minutes in caring for this patient on the day of the visit/encounter

## 2025-07-17 ENCOUNTER — OFFICE VISIT (OUTPATIENT)
Dept: OBGYN CLINIC | Facility: CLINIC | Age: 80
End: 2025-07-17
Payer: MEDICARE

## 2025-07-17 VITALS
SYSTOLIC BLOOD PRESSURE: 130 MMHG | WEIGHT: 151 LBS | HEIGHT: 61 IN | DIASTOLIC BLOOD PRESSURE: 66 MMHG | BODY MASS INDEX: 28.51 KG/M2

## 2025-07-17 DIAGNOSIS — N95.2 POSTMENOPAUSAL ATROPHIC VAGINITIS: ICD-10-CM

## 2025-07-17 DIAGNOSIS — N81.2 CYSTOCELE AND RECTOCELE WITH INCOMPLETE UTEROVAGINAL PROLAPSE: Primary | ICD-10-CM

## 2025-07-17 PROCEDURE — 99213 OFFICE O/P EST LOW 20 MIN: CPT | Performed by: OBSTETRICS & GYNECOLOGY
